# Patient Record
Sex: FEMALE | Race: BLACK OR AFRICAN AMERICAN | Employment: PART TIME
[De-identification: names, ages, dates, MRNs, and addresses within clinical notes are randomized per-mention and may not be internally consistent; named-entity substitution may affect disease eponyms.]

---
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
## 2020-01-17 ENCOUNTER — OFFICE VISIT (OUTPATIENT)
Dept: URGENT CARE | Facility: URGENT CARE | Age: 20
End: 2020-01-17
Payer: MEDICAID

## 2020-01-17 VITALS
RESPIRATION RATE: 16 BRPM | BODY MASS INDEX: 19.56 KG/M2 | SYSTOLIC BLOOD PRESSURE: 95 MMHG | DIASTOLIC BLOOD PRESSURE: 60 MMHG | WEIGHT: 101 LBS | HEART RATE: 70 BPM | OXYGEN SATURATION: 100 % | TEMPERATURE: 98.8 F

## 2020-01-17 DIAGNOSIS — N92.6 MISSED PERIOD: ICD-10-CM

## 2020-01-17 DIAGNOSIS — N30.00 ACUTE CYSTITIS WITHOUT HEMATURIA: Primary | ICD-10-CM

## 2020-01-17 DIAGNOSIS — R82.90 NONSPECIFIC FINDING ON EXAMINATION OF URINE: ICD-10-CM

## 2020-01-17 DIAGNOSIS — R10.32 ABDOMINAL PAIN, LEFT LOWER QUADRANT: ICD-10-CM

## 2020-01-17 LAB
ALBUMIN UR-MCNC: ABNORMAL MG/DL
APPEARANCE UR: CLEAR
BACTERIA #/AREA URNS HPF: ABNORMAL /HPF
BASOPHILS # BLD AUTO: 0 10E9/L (ref 0–0.2)
BASOPHILS NFR BLD AUTO: 0.2 %
BILIRUB UR QL STRIP: ABNORMAL
COLOR UR AUTO: YELLOW
DIFFERENTIAL METHOD BLD: NORMAL
EOSINOPHIL # BLD AUTO: 0.1 10E9/L (ref 0–0.7)
EOSINOPHIL NFR BLD AUTO: 0.6 %
ERYTHROCYTE [DISTWIDTH] IN BLOOD BY AUTOMATED COUNT: 12.8 % (ref 10–15)
GLUCOSE UR STRIP-MCNC: NEGATIVE MG/DL
HCG UR QL: NEGATIVE
HCT VFR BLD AUTO: 40.4 % (ref 35–47)
HGB BLD-MCNC: 13.5 G/DL (ref 11.7–15.7)
HGB UR QL STRIP: ABNORMAL
KETONES UR STRIP-MCNC: NEGATIVE MG/DL
LEUKOCYTE ESTERASE UR QL STRIP: ABNORMAL
LYMPHOCYTES # BLD AUTO: 2.9 10E9/L (ref 0.8–5.3)
LYMPHOCYTES NFR BLD AUTO: 31.1 %
MCH RBC QN AUTO: 31.1 PG (ref 26.5–33)
MCHC RBC AUTO-ENTMCNC: 33.4 G/DL (ref 31.5–36.5)
MCV RBC AUTO: 93 FL (ref 78–100)
MONOCYTES # BLD AUTO: 1.1 10E9/L (ref 0–1.3)
MONOCYTES NFR BLD AUTO: 11.3 %
MUCOUS THREADS #/AREA URNS LPF: PRESENT /LPF
NEUTROPHILS # BLD AUTO: 5.3 10E9/L (ref 1.6–8.3)
NEUTROPHILS NFR BLD AUTO: 56.8 %
NITRATE UR QL: NEGATIVE
NON-SQ EPI CELLS #/AREA URNS LPF: ABNORMAL /LPF
PH UR STRIP: 6 PH (ref 5–7)
PLATELET # BLD AUTO: 237 10E9/L (ref 150–450)
RBC # BLD AUTO: 4.34 10E12/L (ref 3.8–5.2)
RBC #/AREA URNS AUTO: ABNORMAL /HPF
SOURCE: ABNORMAL
SP GR UR STRIP: >1.03 (ref 1–1.03)
UROBILINOGEN UR STRIP-ACNC: 0.2 EU/DL (ref 0.2–1)
WBC # BLD AUTO: 9.4 10E9/L (ref 4–11)
WBC #/AREA URNS AUTO: ABNORMAL /HPF

## 2020-01-17 PROCEDURE — 87086 URINE CULTURE/COLONY COUNT: CPT | Performed by: FAMILY MEDICINE

## 2020-01-17 PROCEDURE — 99204 OFFICE O/P NEW MOD 45 MIN: CPT | Performed by: FAMILY MEDICINE

## 2020-01-17 PROCEDURE — 85025 COMPLETE CBC W/AUTO DIFF WBC: CPT | Performed by: FAMILY MEDICINE

## 2020-01-17 PROCEDURE — 36415 COLL VENOUS BLD VENIPUNCTURE: CPT | Performed by: FAMILY MEDICINE

## 2020-01-17 PROCEDURE — 81025 URINE PREGNANCY TEST: CPT | Performed by: FAMILY MEDICINE

## 2020-01-17 PROCEDURE — 81001 URINALYSIS AUTO W/SCOPE: CPT | Performed by: FAMILY MEDICINE

## 2020-01-17 RX ORDER — CEFDINIR 300 MG/1
300 CAPSULE ORAL 2 TIMES DAILY
Qty: 10 CAPSULE | Refills: 0 | Status: SHIPPED | OUTPATIENT
Start: 2020-01-17 | End: 2020-01-28

## 2020-01-17 NOTE — PROGRESS NOTES
Subjective:   Bennie Beavers is a 19 year old female who presents for   Chief Complaint   Patient presents with     Flank Pain     L lower  side pain that will come and go      Symptoms for a couple weeks of on/off increased frequency. Denies blood seen in urine. No hx of UTI.   Occasional nausea/vomiting.   She does not drink much water each day maybe 2-4 cups on average.   Patient is sexually active current partner for the last couple months.   Recent STD testing 2 months ago, no hx of positive tests.   She has left lower abdominal discomfort - she rates as 5/10  Last bowel movement this morning, not constipated.   Previously on depo and stopped shots early last fall, irregular bleeding since then.     She denies vaginal discharge.     PMH: non-contributory/unremarkable  SH: as above  FH: non-contributory    Patient Active Problem List    Diagnosis Date Noted     NO ACTIVE PROBLEMS 03/12/2012     Priority: Medium       Current Outpatient Medications   Medication     cefdinir (OMNICEF) 300 MG capsule     norelgestromin-ethinyl estradiol (ORTHO EVRA) 150-35 MCG/24HR patch     No current facility-administered medications for this visit.        ROS:   ROS: 10 point ROS neg other than the symptoms noted above in the HPI.      Objective:   BP 95/60   Pulse 70   Temp 98.8  F (37.1  C) (Oral)   Resp 16   Wt 45.8 kg (101 lb)   SpO2 100%   BMI 19.56 kg/m  , Body mass index is 19.56 kg/m .  Gen:  NAD, well-nourished, sitting in chair comfortably  HEENT: EOMI, sclera anicteric, Head normocephalic, ; nares patent; moist mucous membranes  Neck: trachea midline, no thyromegaly  CV:  Hemodynamically stable, RRR  Pulm:  no increased work of breathing , CTAB, no wheezes/rales/rhonchi   ABD: soft, non-distended, minor discomfort of LLQ that improves when tightening abdominal muscles, on guarding  Back: no CVA discomfort  Extrem: no cyanosis, edema or clubbing  Skin: no obvious rashes or abnormalities  Psych: Euthymic, linear  thoughts, normal rate of speech  Gait: normal  MSK: intact movement of all extremities    Results for orders placed or performed in visit on 01/17/20   HCG Qual, Urine ([id number])     Status: None   Result Value Ref Range    HCG Qual Urine Negative NEG^Negative   UA with Microscopic reflex to Culture     Status: Abnormal   Result Value Ref Range    Color Urine Yellow     Appearance Urine Clear     Glucose Urine Negative NEG^Negative mg/dL    Bilirubin Urine Small (A) NEG^Negative    Ketones Urine Negative NEG^Negative mg/dL    Specific Gravity Urine >1.030 1.003 - 1.035    pH Urine 6.0 5.0 - 7.0 pH    Protein Albumin Urine Trace (A) NEG^Negative mg/dL    Urobilinogen Urine 0.2 0.2 - 1.0 EU/dL    Nitrite Urine Negative NEG^Negative    Blood Urine Trace (A) NEG^Negative    Leukocyte Esterase Urine Small (A) NEG^Negative    Source Midstream Urine     WBC Urine 25-50 (A) OTO5^0 - 5 /HPF    RBC Urine O - 2 OTO2^O - 2 /HPF    Squamous Epithelial /LPF Urine Many (A) FEW^Few /LPF    Bacteria Urine Few (A) NEG^Negative /HPF    Mucous Urine Present (A) NEG^Negative /LPF   CBC with platelets and differential     Status: None   Result Value Ref Range    WBC 9.4 4.0 - 11.0 10e9/L    RBC Count 4.34 3.8 - 5.2 10e12/L    Hemoglobin 13.5 11.7 - 15.7 g/dL    Hematocrit 40.4 35.0 - 47.0 %    MCV 93 78 - 100 fl    MCH 31.1 26.5 - 33.0 pg    MCHC 33.4 31.5 - 36.5 g/dL    RDW 12.8 10.0 - 15.0 %    Platelet Count 237 150 - 450 10e9/L    % Neutrophils 56.8 %    % Lymphocytes 31.1 %    % Monocytes 11.3 %    % Eosinophils 0.6 %    % Basophils 0.2 %    Absolute Neutrophil 5.3 1.6 - 8.3 10e9/L    Absolute Lymphocytes 2.9 0.8 - 5.3 10e9/L    Absolute Monocytes 1.1 0.0 - 1.3 10e9/L    Absolute Eosinophils 0.1 0.0 - 0.7 10e9/L    Absolute Basophils 0.0 0.0 - 0.2 10e9/L    Diff Method Automated Method      Assessment & Plan:   Bennie Beavers, 19 year old female who presents with:    Acute cystitis without hematuria  Doubt pyelonephritis based on  exam and lack of CVA tenderness , absent of fever. Cefdinir given for 5 day course; explained that urine culture is pending and this may dictate a change in antibiotics and we will reach out if needed.   - cefdinir (OMNICEF) 300 MG capsule  Dispense: 10 capsule; Refill: 0    Missed period  Irregular periods ,body still adjusting after being on depo provera. Discussed using protection during intercourse and monitoring for menstrual cycle if not present in next couple weeks return for re-evaluation  - HCG Qual, Urine ([id number])    Abdominal pain, left lower quadrant  Reports normal STD testing recently, defers additional testing at this time. No guarding on exam. Absent of vaginal discharge. Normal wbc count. Doubt PID but discussed close monitoring for worsening pain/fever/new symptoms. Possible constipation/indigestion; doubt diverticulitis given her age/habitus/risk factors. Mild discomfort at this time, no pain meds given during this visit. Pain severity not consistent with ovarian torsion  - CBC with platelets and differential  - Urine Culture Aerobic Bacterial    Dawit Hennessy MD   [facility]    The use of Dragon/Surveying And Mapping (SAM) dictation services may have been used to construct the content in this note; any grammatical or spelling errors are non-intentional. Please contact the author of this note directly if you are in need of any clarification.

## 2020-01-17 NOTE — PATIENT INSTRUCTIONS
Take medication Cefdinir twice a day for 5 days to treat your UTI/Bladder infection    Symptoms should improve within the next 2-3 days. If no improvement, call clinic to discuss or return for re-evaluation    Drink approximately 60 ounces of fluid a day to stay hydrated.     If you develop flank pain, fevers, nausea/vomiting call immediately for assistance      If your abdominal pain gets worse, if you develop fever, or have new symptoms please return right away to be seen    --  If your period doesn't return by the next cycle please return to be re-evaluated

## 2020-01-18 LAB
BACTERIA SPEC CULT: NORMAL
SPECIMEN SOURCE: NORMAL

## 2020-01-20 ENCOUNTER — APPOINTMENT (OUTPATIENT)
Dept: CT IMAGING | Facility: CLINIC | Age: 20
End: 2020-01-20
Attending: EMERGENCY MEDICINE
Payer: MEDICAID

## 2020-01-20 ENCOUNTER — HOSPITAL ENCOUNTER (EMERGENCY)
Facility: CLINIC | Age: 20
Discharge: HOME | End: 2020-01-21
Attending: EMERGENCY MEDICINE | Admitting: EMERGENCY MEDICINE
Payer: MEDICAID

## 2020-01-20 DIAGNOSIS — R10.84 ABDOMINAL PAIN, GENERALIZED: ICD-10-CM

## 2020-01-20 DIAGNOSIS — A59.9 TRICHIMONIASIS: ICD-10-CM

## 2020-01-20 LAB
ALBUMIN SERPL-MCNC: 4.7 G/DL (ref 3.4–5)
ALBUMIN UR-MCNC: 30 MG/DL
ALBUMIN UR-MCNC: NEGATIVE MG/DL
ALP SERPL-CCNC: 83 U/L (ref 40–150)
ALT SERPL W P-5'-P-CCNC: 12 U/L (ref 0–50)
ANION GAP SERPL CALCULATED.3IONS-SCNC: 7 MMOL/L (ref 3–14)
APPEARANCE UR: ABNORMAL
APPEARANCE UR: ABNORMAL
AST SERPL W P-5'-P-CCNC: 10 U/L (ref 0–35)
BACTERIA #/AREA URNS HPF: ABNORMAL /HPF
BASOPHILS # BLD AUTO: 0 10E9/L (ref 0–0.2)
BASOPHILS NFR BLD AUTO: 0.2 %
BILIRUB SERPL-MCNC: 1 MG/DL (ref 0.2–1.3)
BILIRUB UR QL STRIP: NEGATIVE
BILIRUB UR QL STRIP: NEGATIVE
BUN SERPL-MCNC: 6 MG/DL (ref 7–30)
CALCIUM SERPL-MCNC: 10.2 MG/DL (ref 8.5–10.1)
CHLORIDE SERPL-SCNC: 104 MMOL/L (ref 96–110)
CO2 SERPL-SCNC: 23 MMOL/L (ref 20–32)
COLOR UR AUTO: YELLOW
COLOR UR AUTO: YELLOW
CREAT SERPL-MCNC: 0.68 MG/DL (ref 0.5–1)
DIFFERENTIAL METHOD BLD: ABNORMAL
EOSINOPHIL # BLD AUTO: 0 10E9/L (ref 0–0.7)
EOSINOPHIL NFR BLD AUTO: 0 %
ERYTHROCYTE [DISTWIDTH] IN BLOOD BY AUTOMATED COUNT: 13.1 % (ref 10–15)
GFR SERPL CREATININE-BSD FRML MDRD: >90 ML/MIN/{1.73_M2}
GLUCOSE SERPL-MCNC: 81 MG/DL (ref 70–99)
GLUCOSE UR STRIP-MCNC: NEGATIVE MG/DL
GLUCOSE UR STRIP-MCNC: NEGATIVE MG/DL
HCG UR QL: NEGATIVE
HCT VFR BLD AUTO: 43.4 % (ref 35–47)
HGB BLD-MCNC: 14.9 G/DL (ref 11.7–15.7)
HGB UR QL STRIP: ABNORMAL
HGB UR QL STRIP: ABNORMAL
IMM GRANULOCYTES # BLD: 0 10E9/L (ref 0–0.4)
IMM GRANULOCYTES NFR BLD: 0.2 %
KETONES UR STRIP-MCNC: NEGATIVE MG/DL
KETONES UR STRIP-MCNC: NEGATIVE MG/DL
LEUKOCYTE ESTERASE UR QL STRIP: ABNORMAL
LEUKOCYTE ESTERASE UR QL STRIP: ABNORMAL
LIPASE SERPL-CCNC: 46 U/L (ref 73–393)
LYMPHOCYTES # BLD AUTO: 3.3 10E9/L (ref 0.8–5.3)
LYMPHOCYTES NFR BLD AUTO: 26.6 %
MCH RBC QN AUTO: 31.6 PG (ref 26.5–33)
MCHC RBC AUTO-ENTMCNC: 34.3 G/DL (ref 31.5–36.5)
MCV RBC AUTO: 92 FL (ref 78–100)
MONOCYTES # BLD AUTO: 1.3 10E9/L (ref 0–1.3)
MONOCYTES NFR BLD AUTO: 10.4 %
MUCOUS THREADS #/AREA URNS LPF: PRESENT /LPF
MUCOUS THREADS #/AREA URNS LPF: PRESENT /LPF
NEUTROPHILS # BLD AUTO: 7.7 10E9/L (ref 1.6–8.3)
NEUTROPHILS NFR BLD AUTO: 62.6 %
NITRATE UR QL: NEGATIVE
NITRATE UR QL: NEGATIVE
NRBC # BLD AUTO: 0 10*3/UL
NRBC BLD AUTO-RTO: 0 /100
PH UR STRIP: 6.5 PH (ref 5–7)
PH UR STRIP: 6.5 PH (ref 5–7)
PLATELET # BLD AUTO: 237 10E9/L (ref 150–450)
POTASSIUM SERPL-SCNC: 3.5 MMOL/L (ref 3.4–5.3)
PROT SERPL-MCNC: 8.3 G/DL (ref 6.8–8.8)
RBC # BLD AUTO: 4.71 10E12/L (ref 3.8–5.2)
RBC #/AREA URNS AUTO: 11 /HPF (ref 0–2)
RBC #/AREA URNS AUTO: 24 /HPF (ref 0–2)
SODIUM SERPL-SCNC: 134 MMOL/L (ref 133–144)
SOURCE: ABNORMAL
SOURCE: ABNORMAL
SP GR UR STRIP: 1.01 (ref 1–1.03)
SP GR UR STRIP: 1.02 (ref 1–1.03)
SQUAMOUS #/AREA URNS AUTO: 16 /HPF (ref 0–1)
SQUAMOUS #/AREA URNS AUTO: 9 /HPF (ref 0–1)
TRICHOMONAS #/AREA URNS HPF: PRESENT /HPF
UROBILINOGEN UR STRIP-MCNC: NORMAL MG/DL (ref 0–2)
UROBILINOGEN UR STRIP-MCNC: NORMAL MG/DL (ref 0–2)
WBC # BLD AUTO: 12.2 10E9/L (ref 4–11)
WBC #/AREA URNS AUTO: 23 /HPF (ref 0–5)
WBC #/AREA URNS AUTO: >182 /HPF (ref 0–5)

## 2020-01-20 PROCEDURE — 87086 URINE CULTURE/COLONY COUNT: CPT | Performed by: EMERGENCY MEDICINE

## 2020-01-20 PROCEDURE — 81025 URINE PREGNANCY TEST: CPT | Performed by: EMERGENCY MEDICINE

## 2020-01-20 PROCEDURE — 81001 URINALYSIS AUTO W/SCOPE: CPT | Performed by: EMERGENCY MEDICINE

## 2020-01-20 PROCEDURE — 96374 THER/PROPH/DIAG INJ IV PUSH: CPT

## 2020-01-20 PROCEDURE — 80053 COMPREHEN METABOLIC PANEL: CPT | Performed by: EMERGENCY MEDICINE

## 2020-01-20 PROCEDURE — 25000132 ZZH RX MED GY IP 250 OP 250 PS 637: Performed by: EMERGENCY MEDICINE

## 2020-01-20 PROCEDURE — 87210 SMEAR WET MOUNT SALINE/INK: CPT | Performed by: EMERGENCY MEDICINE

## 2020-01-20 PROCEDURE — 96372 THER/PROPH/DIAG INJ SC/IM: CPT | Mod: 59

## 2020-01-20 PROCEDURE — 83690 ASSAY OF LIPASE: CPT | Performed by: EMERGENCY MEDICINE

## 2020-01-20 PROCEDURE — 87491 CHLMYD TRACH DNA AMP PROBE: CPT | Performed by: EMERGENCY MEDICINE

## 2020-01-20 PROCEDURE — 74176 CT ABD & PELVIS W/O CONTRAST: CPT

## 2020-01-20 PROCEDURE — 87591 N.GONORRHOEAE DNA AMP PROB: CPT | Performed by: EMERGENCY MEDICINE

## 2020-01-20 PROCEDURE — 99285 EMERGENCY DEPT VISIT HI MDM: CPT | Mod: 25

## 2020-01-20 PROCEDURE — 25000128 H RX IP 250 OP 636: Performed by: EMERGENCY MEDICINE

## 2020-01-20 PROCEDURE — 85025 COMPLETE CBC W/AUTO DIFF WBC: CPT | Performed by: EMERGENCY MEDICINE

## 2020-01-20 RX ORDER — KETOROLAC TROMETHAMINE 15 MG/ML
15 INJECTION, SOLUTION INTRAMUSCULAR; INTRAVENOUS ONCE
Status: COMPLETED | OUTPATIENT
Start: 2020-01-20 | End: 2020-01-20

## 2020-01-20 RX ORDER — METRONIDAZOLE 500 MG/1
2000 TABLET ORAL ONCE
Status: COMPLETED | OUTPATIENT
Start: 2020-01-20 | End: 2020-01-20

## 2020-01-20 RX ORDER — AZITHROMYCIN 250 MG/1
1000 TABLET, FILM COATED ORAL ONCE
Status: COMPLETED | OUTPATIENT
Start: 2020-01-20 | End: 2020-01-20

## 2020-01-20 RX ADMIN — AZITHROMYCIN MONOHYDRATE 1000 MG: 250 TABLET ORAL at 23:47

## 2020-01-20 RX ADMIN — KETOROLAC TROMETHAMINE 15 MG: 15 INJECTION, SOLUTION INTRAMUSCULAR; INTRAVENOUS at 19:09

## 2020-01-20 RX ADMIN — METRONIDAZOLE 2000 MG: 500 TABLET ORAL at 23:47

## 2020-01-20 ASSESSMENT — ENCOUNTER SYMPTOMS
ABDOMINAL PAIN: 1
HEADACHES: 1
DYSURIA: 0
SHORTNESS OF BREATH: 0
CONSTIPATION: 0
APPETITE CHANGE: 0

## 2020-01-20 ASSESSMENT — MIFFLIN-ST. JEOR: SCORE: 1141.03

## 2020-01-20 NOTE — ED AVS SNAPSHOT
Emergency Department  [address] [facility] [address]  Phone:  [phone]  Fax:  [phone]                                    Bennie Beavers   MRN: [id number]    Department:   Emergency Department   Date of Visit:  1/20/2020           After Visit Summary Signature Page    I have received my discharge instructions, and my questions have been answered. I have discussed any challenges I see with this plan with the nurse or doctor.    ..........................................................................................................................................  Patient/Patient Representative Signature      ..........................................................................................................................................  Patient Representative Print Name and Relationship to Patient    ..................................................               ................................................  Date                                   Time    ..........................................................................................................................................  Reviewed by Signature/Title    ...................................................              ..............................................  Date                                               Time          22EPIC Rev 08/18

## 2020-01-21 ENCOUNTER — TELEPHONE (OUTPATIENT)
Dept: EMERGENCY MEDICINE | Facility: CLINIC | Age: 20
End: 2020-01-21

## 2020-01-21 VITALS
DIASTOLIC BLOOD PRESSURE: 86 MMHG | HEART RATE: 78 BPM | TEMPERATURE: 97.9 F | OXYGEN SATURATION: 99 % | SYSTOLIC BLOOD PRESSURE: 117 MMHG | RESPIRATION RATE: 18 BRPM | BODY MASS INDEX: 19.24 KG/M2 | HEIGHT: 60 IN | WEIGHT: 98 LBS

## 2020-01-21 LAB
BACTERIA SPEC CULT: NO GROWTH
C TRACH DNA SPEC QL NAA+PROBE: POSITIVE
Lab: NORMAL
N GONORRHOEA DNA SPEC QL NAA+PROBE: POSITIVE
SPECIMEN SOURCE: ABNORMAL
SPECIMEN SOURCE: NORMAL
WET PREP SPEC: ABNORMAL

## 2020-01-21 PROCEDURE — 25000128 H RX IP 250 OP 636: Performed by: EMERGENCY MEDICINE

## 2020-01-21 PROCEDURE — 25000125 ZZHC RX 250: Performed by: EMERGENCY MEDICINE

## 2020-01-21 RX ADMIN — LIDOCAINE HYDROCHLORIDE 250 MG: 10 INJECTION, SOLUTION EPIDURAL; INFILTRATION; INTRACAUDAL; PERINEURAL at 00:07

## 2020-01-21 NOTE — ED NOTES
Quick catheter for urine specimen did not provide enough urine to send for test. Pt provided ice water. MD notified.

## 2020-01-21 NOTE — TELEPHONE ENCOUNTER
[facility] Emergency Department Lab result notification:    [place] ED lab result protocol used  N. Gonorrhea and Chlamydia protocols    Reason for call  Notify of lab results, assess symptoms,  review ED providers recommendations/discharge instructions (if necessary) and advise per ED lab result f/u protocol    Lab Result   Final N. Gonorrhoeae PCR is [POSITIVE] AND Chlamydia T PCR is [POSITIVE]  Patient was treated for N. Gonorrhea and Chlamydia T in the ED  [Yes or No]:  Yes       If Yes, list what was given in the ED (dual treatment for N Gonorrhea):  Azithromycin 1000 mg PO x 1 AND Ceftriaxone (Rocephin) 250 mg IM x 1  If treated appropriately in the [place] ED, notify patient of result and STD instructions.  Information table from ED Provider visit on 1/20/2020-1/21/2020  Symptoms reported at ED visit (Chief complaint, HPI) NADIRA Beavers is a 19 year old female who presents with abdominal pain and headache. The patient reports intermittent sharp left sided abdominal pain that started soon after she woke up this morning. She states the pain came on, lasted a few minutes, and then completely resolved. She has not had pain like this before. She has been able to tolerate oral intake and denies dysuria constipation, or vaginal discharge. She reports her last menstrual cycle was irregular. She is not on birth control and states she has not had intercourse for several months. She denies a history of abdominal surgery, colitis, diverticulitis, or kidney stones.     She reports her headache started after her abdominal pain and has been intermittent throughout the day though it is not present now. She states she does have a history of headaches, but reports they are not as persistent as her current headache as they do not typically return after Ibuprofen. She last had ibuprofen at 1400 today. She denies any recent head trauma. She also denies chest pain, shortness of breath, vision changes,  rash. She denies any sick contacts.   ED providers Impression and Plan (applicable information) This is a 19 year old female who presents with headache and left sided abdominal discomfort. She denies fevers, shaking, chills, or urinary symptoms. She was on birth control until the beginning of the summer, and then stopped that. She was sexually active about 2 months ago and has noted some mild discharge. She later told the nurse that last month she had a positive pregnancy test, but it was rechecked and it was then negative. On exam, she has mild left sided abdominal pain both upper and lower quadrants. However, there was no guarding or rebound. Pelvic exam reveals yellowish green discharge. Two urine samples were obtained, and the patient refused a catheterized specimen for the second. These were both contaminated. However, her second urine did show trichomonas. GC and Chlamydia were sent and were pending. CT scan was done and there are no signs of stones, bowel problems, or abnormal pelvic lesions. After my exam and given her normal white count, I think this is unlikely to be tuboovarian abscess. She is not pregnant. This could, however, be consistent with GC or Chlamydia infection. The patient is feeling much better stating her pain is essentially gone after Toradol. I will empirically give her one dose of IM Rocephin, 2 g of Flagyl for her Trichomonas, and 1 g of Azithromycin. I referred her to Sky Bonds in the [facility] clinic and told her to follow up in the next two days to follow up on her urine cultures and GC and Chlamydia cultures. I have also told her to contact her partner and have him  treated. She will return for increased pain, fever, or persistent vomiting   Miscellaneous information na     RN Assessment (Patient s current Symptoms), include time called.  [Insert Left message here if message left]  2;16PM: Spoke with patient. She states she is feeling better today.   RN  Recommendations/Instructions per [facility] ED lab result protocol  Patient notified of lab result and treatment recommendation.   Reviewed Gonorrhea and Chlamydia information from protocol, RN action.   STD Patient Instructions:    We recommend that you contact any recent sexual partners within the last 2 months and have them evaluated by a physician.    Avoid sexual activity for 7 to 10 days or until both your and your partner(s) have completed all antibiotic medications.    We advise that you consider following up with your PCP at approximately 3 months for retesting to be sure the infection has cleared.    Advised to follow up with PCP within 3 days as directed by the ED provider. The patient is comfortable with the plan of care and she was transferred to scheduling to make a clinic appointment.   She has no further questions.     Please Contact your PCP clinic or return to the Emergency department if your:    Symptoms worsen or other concerning symptom's.    PCP follow-up Questions asked: YES       [RN Name]  Ifrah Estevez RN  [facility] RN  Lung Nodule and ED Lab Result RN  Epic pool (ED late result f/u RN): [id number]  [facility] INCIDENTAL RADIOLOGY F/U NURSES: P [id number]  # [phone]      Copy of Lab result

## 2020-01-21 NOTE — ED PROVIDER NOTES
History   Chief Complaint:  Abdominal Pain and Headache     HPI   Bennie Beavers is a 19 year old female who presents with abdominal pain and headache. The patient reports intermittent sharp left sided abdominal pain that started soon after she woke up this morning. She states the pain came on, lasted a few minutes, and then completely resolved. She has not had pain like this before. She has been able to tolerate oral intake and denies dysuria constipation, or vaginal discharge. She reports her last menstrual cycle was irregular. She is not on birth control and states she has not had intercourse for several months. She denies a history of abdominal surgery, colitis, diverticulitis, or kidney stones.    She reports her headache started after her abdominal pain and has been intermittent throughout the day though it is not present now. She states she does have a history of headaches, but reports they are not as persistent as her current headache as they do not typically return after Ibuprofen. She last had ibuprofen at 1400 today. She denies any recent head trauma. She also denies chest pain, shortness of breath, vision changes, rash. She denies any sick contacts.     Allergies:  No Known Drug Allergies    Medications:    Medications reviewed. No current medications.     Past Medical History:    Medical history reviewed. No pertinent medical history.    Past Surgical History:    Surgical history reviewed. No pertinent surgical history.    Family History:    Father: eye disorder  Brother: eye disorder     Social History:  Smoking Status: passive smoker  Smokeless Tobacco: Never Used  Alcohol Use: No  Drug Use: No  PCP: Arabella Schmidt   Works in retail     Review of Systems   Constitutional: Negative for appetite change.   Eyes: Negative for visual disturbance.   Respiratory: Negative for shortness of breath.    Cardiovascular: Negative for chest pain.   Gastrointestinal: Positive for abdominal pain. Negative for  constipation.   Genitourinary: Negative for dysuria and vaginal discharge.   Skin: Negative for rash.   Neurological: Positive for headaches.   All other systems reviewed and are negative.    Physical Exam     Patient Vitals for the past 24 hrs:   BP Temp Temp src Pulse Heart Rate Resp SpO2 Height Weight   01/20/20 2039 -- -- -- -- -- -- 100 % -- --   01/20/20 2038 139/81 -- -- 67 -- -- -- -- --   01/20/20 1726 134/79 97.9  F (36.6  C) Temporal -- 70 18 98 % 1.524 m (5') 44.5 kg (98 lb)       Physical Exam  Constitutional: small black female, supine   HENT: No signs of trauma.   Eyes: EOM are normal. Pupils are equal, round, and reactive to light.   Neck: Normal range of motion. No JVD present. No cervical adenopathy.  Cardiovascular: Regular rhythm.  Exam reveals no gallop and no friction rub.    No murmur heard.  Pulmonary/Chest: Bilateral breath sounds normal. No wheezes, rhonchi or rales.  Abdominal: Soft. Mild left sided tenderness. No rebound or guarding. 2+ femoral pulses. Active bowel sounds. No CVA tenderness.     Pelvic: Vulva negative. Vagina greenish yellow discharge. No cervical motion tenderness. Slight left adnexal tenderness. No uterine or adnexal masses.  Musculoskeletal: No edema. No tenderness.   Lymphadenopathy: No lymphadenopathy.   Neurological: Alert and oriented to person, place, and time. Normal strength. Coordination normal.   Skin: Skin is warm and dry. No rash noted. No erythema.     Emergency Department Course   Imaging:  Radiology findings were communicated with the patient who voiced understanding of the findings.    Abd/pelvis CT no contrast - Stone Protocol  1. No urinary tract calculi or hydronephrosis.  2. No bowel obstruction, diverticulitis or appendicitis.    Reading per radiology    Laboratory:  Laboratory findings were communicated with the patient who voiced understanding of the findings.    Wet prep: Trichomonas seen. No yeast seen. Clue cells seen. Many PMNs seen.    Chlamydia trachomatis PCR: pending  Neisseria gonorrhea PCR: pending    UA with microscopic(2215): blood large, protein albumin 30, leukocyte esterase large, RBC 24, WBC >182, squamous epithelial 16, mucous present, trichomonas present o/w WNL  Urine culture pending     UA with microscopic(1908): blood large, leukocyte esterase large, WBC 23, RBC 11, bacteria present, squamous epithelial 9, mucous present o/w WNL   HCG urine: negative   Urine culture pending    CBC: WBC 12.2, HGB 14.9,   CMP: BUN 6, calcium 10.2 o/w WNL (Creatinine 0.68)  Lipase: 46    Interventions:  1909 Toradol 15 mg IV  2347 Flagyl 2000 mg Oral  2347 Azithromycin 1000 mg Oral  0007 Rocephin 250 mg IM    Emergency Department Course:  Nursing notes and vitals reviewed.    1827 I performed an exam of the patient as documented above.     IV was inserted and blood was drawn for laboratory testing, results above.    The patient provided a urine sample here in the emergency department. This was sent for laboratory testing, findings above.     The patient was sent for a CT Abdomen Pelvis while in the emergency department, results above.      The patient provided a urine sample here in the emergency department. This was sent for laboratory testing, findings above.     2332 I preformed a chaperoned pelvic exam as noted above. I rechecked the patient. Explained findings to the Patient.    Findings and plan explained to the Patient. Patient discharged home with instructions regarding supportive care, medications, and reasons to return. The importance of close follow-up was reviewed.      Impression & Plan    Medical Decision Making:  This is a 19 year old female who presents with headache and left sided abdominal discomfort. She denies fevers, shaking, chills, or urinary symptoms. She was on birth control until the beginning of the summer, and then stopped that. She was sexually active about 2 months ago and has noted some mild discharge. She later  told the nurse that last month she had a positive pregnancy test, but it was rechecked and it was then negative. On exam, she has mild left sided abdominal pain both upper and lower quadrants. However, there was no guarding or rebound. Pelvic exam reveals yellowish green discharge. Two urine samples were obtained, and the patient refused a catheterized specimen for the second. These were both contaminated. However, her second urine did show trichomonas. GC and Chlamydia were sent and were pending. CT scan was done and there are no signs of stones, bowel problems, or abnormal pelvic lesions. After my exam and given her normal white count, I think this is unlikely to be tuboovarian abscess. She is not pregnant. This could, however, be consistent with GC or Chlamydia infection. The patient is feeling much better stating her pain is essentially gone after Toradol. I will empirically give her one dose of IM Rocephin, 2 g of Flagyl for her Trichomonas, and 1 g of Azithromycin. I referred her to Sky Bonds in the [facility] clinic and told her to follow up in the next two days to follow up on her urine cultures and GC and Chlamydia cultures. I have also told her to contact her partner and have him  treated. She will return for increased pain, fever, or persistent vomiting.     Diagnosis:    ICD-10-CM    1. Abdominal pain, generalized R10.84 Wet prep     Chlamydia trachomatis PCR     Neisseria gonorrhoeae PCR   2. Trichimoniasis A59.9        Disposition:   discharged to home    Scribe Disclosure:  I, Olena Prado, am serving as a scribe at 6:27 PM on 1/20/2020 to document services personally performed by Ventura Gonsalez MD based on my observations and the provider's statements to me.   [facility] EMERGENCY DEPARTMENT       Ventura Gonsalez MD  01/21/20 0209

## 2020-01-22 LAB
BACTERIA SPEC CULT: NO GROWTH
Lab: NORMAL
SPECIMEN SOURCE: NORMAL

## 2020-01-22 NOTE — RESULT ENCOUNTER NOTE
Final urine culture report is NEGATIVE per [facility] ED Lab Result protocol.    If NEGATIVE result, no change in treatment, per [facility] ED Lab Result protocol.

## 2020-01-28 ENCOUNTER — OFFICE VISIT (OUTPATIENT)
Dept: INTERNAL MEDICINE | Facility: CLINIC | Age: 20
End: 2020-01-28
Payer: MEDICAID

## 2020-01-28 VITALS
DIASTOLIC BLOOD PRESSURE: 60 MMHG | OXYGEN SATURATION: 99 % | BODY MASS INDEX: 19.92 KG/M2 | SYSTOLIC BLOOD PRESSURE: 90 MMHG | WEIGHT: 102 LBS | RESPIRATION RATE: 16 BRPM | HEART RATE: 64 BPM

## 2020-01-28 DIAGNOSIS — N93.9 VAGINAL BLEEDING PROBLEMS: Primary | ICD-10-CM

## 2020-01-28 DIAGNOSIS — Z86.19 HISTORY OF TRICHOMONIASIS: ICD-10-CM

## 2020-01-28 DIAGNOSIS — Z86.19 HISTORY OF GONORRHEA: ICD-10-CM

## 2020-01-28 DIAGNOSIS — Z86.19 HISTORY OF CHLAMYDIA INFECTION: ICD-10-CM

## 2020-01-28 PROCEDURE — 99213 OFFICE O/P EST LOW 20 MIN: CPT | Performed by: INTERNAL MEDICINE

## 2020-01-28 NOTE — PATIENT INSTRUCTIONS
Please see ob/gyn to discuss vaginal bleeding ([facility] or the one below).    Follow-up gonorrhea, chlamydia, and trichomonas testing in 3 months please (can be a lab visit only).

## 2020-01-28 NOTE — PROGRESS NOTES
SUBJECTIVE:                                                      HPI: Bennie Beavers is a pleasant 19 year old female who presents for ER follow-up:    Patient presented to the ER last week with abdominal pain and a headache. CT abdomen pelvis negative. UA abnormal, but culture negative. Labs generally unremarkable other than mild leukocytosis. Diagnosed with and treated for gonorrhea, chlamydia, and trichomoniasis.    No recurrent abdominal pain or headaches since her ER visit. No vaginal itching or irritation. No fevers or chills. Patient complains of ongoing vaginal bleeding. This is chronic in nature - ongoing for many months. Vaginal bleeding is present for most of the month.  Bleeding started while she was using Depo-Provera for birth control. Did not improve after stopping Depo-Provera. Started combination OCP, but bleeding still did not improve, so she stopped it.     She is not on birth control currently. Not sexually active currently.    No anemia on recent labs. Vital signs within normal limits.    She is scheduled to see [facility] later this week for further evaluation.    The medication, allergy, and problem lists have been reviewed and updated as appropriate.     OBJECTIVE:                                                      BP 90/60   Pulse 64   Resp 16   Wt 46.3 kg (102 lb)   LMP  (LMP Unknown)   SpO2 99%   BMI 19.92 kg/m    Constitutional: well-appearing  Psych: normal judgment and insight; normal mood and affect; recent and remote memory intact    ASSESSMENT/PLAN:                                                      (N93.9) Vaginal bleeding problems  (primary encounter diagnosis)  Comment: agree with OB/GYN evaluation - already scheduled.    (Z86.19) History of chlamydia infection  (Z86.19) History of gonorrhea  (Z86.19) History of trichomoniasis  Plan: recommend retesting in 3 months (can be lab visit only).    The instructions on the AVS were discussed and explained to the  patient. Patient expressed understanding of instructions.    (Chart documentation was completed, in part, with [employer] voice-recognition software. Even though reviewed, some grammatical, spelling, and word errors may remain.)    Alisa Lopez MD   [address] [address]  T: [phone], F: [phone]